# Patient Record
(demographics unavailable — no encounter records)

---

## 2024-12-30 NOTE — DISEASE MANAGEMENT
[Pathological] : TNM Stage: p [TTNM] : is [NTNM] : x [MTNM] : x [0] : 0 [de-identified] : 6591 [de-identified] : 5356 [de-identified] : right partial breast completed 6/3/24

## 2024-12-30 NOTE — HISTORY OF PRESENT ILLNESS
[FreeTextEntry1] : Ms. Roldan is a 65-year-old woman with DCIS of the left breast, status-post breast-conserving surgery and adjuvant radiation, presenting today for f/u.  She completed RT 5 fractions to her left partial breast for a total of 3000 cGy on 6/3/24.  She had her follow-up with Dr. Perry on 7/26/24. Bilateral mammogram and ultrasound are due in January 2025.  Did she start Tamoxifen under the care of Dr Jeanna Hilton in July?

## 2025-01-07 NOTE — REVIEW OF SYSTEMS
[Negative] : Allergic/Immunologic [Edema Limbs: Grade 0] : Edema Limbs: Grade 0  [Fatigue: Grade 0] : Fatigue: Grade 0 [Breast Pain: Grade 0] : Breast Pain: Grade 0 [Pruritus: Grade 0] : Pruritus: Grade 0 [Skin Hyperpigmentation: Grade 0] : Skin Hyperpigmentation: Grade 0 [Dermatitis Radiation: Grade 0] : Dermatitis Radiation: Grade 0

## 2025-01-08 NOTE — PHYSICAL EXAM
[] : no respiratory distress [Respiration, Rhythm And Depth] : normal respiratory rhythm and effort [Exaggerated Use Of Accessory Muscles For Inspiration] : no accessory muscle use [Nondistended] : nondistended [Supraclavicular Lymph Nodes Enlarged Bilaterally] : supraclavicular [Axillary Lymph Nodes Enlarged Bilaterally] : axillary [Normal] : oriented to person, place and time, the affect was normal, the mood was normal and not anxious [de-identified] : symmetric in size and shape; no suspicious masses; no long-term sequelae of RT-Tx

## 2025-01-08 NOTE — DISEASE MANAGEMENT
[TTNM] : is [NTNM] : x [MTNM] : x [de-identified] : 9212 [de-identified] : 5235 [de-identified] : right partial breast completed 6/3/24

## 2025-01-08 NOTE — PHYSICAL EXAM
[] : no respiratory distress [Respiration, Rhythm And Depth] : normal respiratory rhythm and effort [Exaggerated Use Of Accessory Muscles For Inspiration] : no accessory muscle use [Nondistended] : nondistended [Supraclavicular Lymph Nodes Enlarged Bilaterally] : supraclavicular [Axillary Lymph Nodes Enlarged Bilaterally] : axillary [Normal] : oriented to person, place and time, the affect was normal, the mood was normal and not anxious [de-identified] : symmetric in size and shape; no suspicious masses; no long-term sequelae of RT-Tx

## 2025-01-08 NOTE — DISEASE MANAGEMENT
[TTNM] : is [NTNM] : x [MTNM] : x [de-identified] : 2986 [de-identified] : 6207 [de-identified] : right partial breast completed 6/3/24

## 2025-01-08 NOTE — HISTORY OF PRESENT ILLNESS
[FreeTextEntry1] : Ms. Roldan is a 65-year-old woman with DCIS of the left breast, status-post breast-conserving surgery and adjuvant radiation, presenting today for follow-up s/p RT 5 fractions to her left partial breast for a total of 3000 cGy on 6/3/24.  Bilateral mammogram and ultrasound are scheduled for January 26, 2025 and she will see Dr. Perry in February.  She started Tamoxifen under the care of Dr. Hilton whom she will see in March. She denies any side effects from the medication.  01/07/25 Ms. Roldan is doing well, denies any breast pain or ROM difficulty. She reports receiving 2 sessions of physical therapy for her left breast scar tissue in the summer.

## 2025-01-08 NOTE — PHYSICAL EXAM
[] : no respiratory distress [Respiration, Rhythm And Depth] : normal respiratory rhythm and effort [Exaggerated Use Of Accessory Muscles For Inspiration] : no accessory muscle use [Nondistended] : nondistended [Supraclavicular Lymph Nodes Enlarged Bilaterally] : supraclavicular [Axillary Lymph Nodes Enlarged Bilaterally] : axillary [Normal] : oriented to person, place and time, the affect was normal, the mood was normal and not anxious [de-identified] : symmetric in size and shape; no suspicious masses; no long-term sequelae of RT-Tx

## 2025-01-08 NOTE — DISEASE MANAGEMENT
[TTNM] : is [NTNM] : x [MTNM] : x [de-identified] : 2208 [de-identified] : 4277 [de-identified] : right partial breast completed 6/3/24

## 2025-01-10 NOTE — ASSESSMENT
[FreeTextEntry1] : The patient is a 66-year-old G4, P1 postmenopausal white female with Polish, Irish, and Slovenian descent.  She underwent menarche at age 12 and had her first child at age 39.  She underwent menopause at age 42 and did use progesterone suppositories for couple years after menopause.  She did undergo fertility treatment in the past for about 6 months.  She quit smoking in her 30s and only rarely smoked in the past.  She has no known drug allergies.  She has a past medical history just significant for some hypertension as well as hypercholesterolemia and hypothyroidism.  She underwent a routine screening mammography on January 24, 2024 here at [facility] which showed some architectural distortion in her left breast upper outer quadrant with some calcifications seen in the left breast 3 and 6:00 regions.  Diagnostic mammography and ultrasound showed the architectural distortion to dissipate however the calcifications were indeterminant and ultrasound just showed some benign cystic findings.  She underwent ultrasound-guided core biopsies of the left breast on March 6, 2024 at [facility] and the calcifications at the 3-4 o'clock region did show intermediate grade DCIS which was ER/SD strongly positive between 91 and 100%.  There was a "cork" clip placed at that biopsy site.  The left breast 5-6 o'clock biopsy result was benign with a "Top-Hat" clip placed at that biopsy site.  I did review her imaging studies from [facility] on the PACS system which indeed showed this very small early area of calcifications in the left breast 3 and 6:00 region with the 3:00 region did show this intermediate grade DCIS.  She underwent a bilateral breast MRI on March 19, 2024 at [facility] just showing the localized DCIS in the left breast 3:00 region with some small hematoma surrounding both biopsy sites but no evidence of multifocal or contralateral disease.  [facility] genetic panel testing performed in March 2024 was negative for the breast, ovarian, colon cancer gene panel.  She then underwent a left breast partial mastectomy with Magseed localization and tissue transfer closure but no rk evaluation performed on April 5, 2024.  The final pathology showed intermediate grade DCIS measuring 5 mm with free margins.  She was seen by Dr. Jeanna Hilton and endocrine therapy was discussed and she was placed on tamoxifen 10 mg every other day.  She was seen by Dr. Sultana and underwent the FAST 5-day fractionated radiation to the left breast which finished on Tammie 3, 2024.  She underwent her last bilateral mammography and ultrasound which was reviewed from ??????.  On exam today, she has healed well from her left breast partial mastectomy and has done well after radiation therapy and has no evidence of recurrence.  She was reassured and I would like to see her again in 6 months around July 2025.  She will be due for her routine bilateral mammography and ultrasound again in ?????? and she was given prescriptions.  She will continue to follow-up with Dr. Jeanna Hilton and will remain on tamoxifen 10 mg every other day.

## 2025-01-10 NOTE — ADDENDUM
[FreeTextEntry1] : I spent greater than 75% of consultation in face-to-face counseling and coordination of care in this patient with a history of a left breast partial mastectomy for DCIS who comes in now for routine breast cancer screening/surveillance.

## 2025-01-10 NOTE — HISTORY OF PRESENT ILLNESS
[FreeTextEntry1] : The patient is a 66-year-old G4, P1 postmenopausal white female with Polish, Estonian, and Danish descent.  She underwent menarche at age 12 and had her first child at age 39.  She underwent menopause at age 42 and did use progesterone suppositories for couple years after menopause.  She did undergo fertility treatment in the past for about 6 months.  She quit smoking in her 30s and only rarely smoked in the past.  She has no known drug allergies.  She has a past medical history just significant for some hypertension as well as hypercholesterolemia and hypothyroidism.  She underwent a routine screening mammography on January 24, 2024 here at [facility] which showed some architectural distortion in her left breast upper outer quadrant with some calcifications seen in the left breast 3 and 6:00 regions.  Diagnostic mammography and ultrasound showed the architectural distortion to dissipate however the calcifications were indeterminant and ultrasound just showed some benign cystic findings.  She underwent ultrasound-guided core biopsies of the left breast on March 6, 2024 at [facility] in the calcifications at the 3-4 o'clock region did show intermediate grade DCIS which was ER/RI strongly positive between 91 and 100%.  There was a "cork" clip placed at that biopsy site.  The left breast 5-6 o'clock biopsy result was benign with a "Top-Hat" clip placed at that biopsy site.  She underwent a bilateral breast MRI on March 19, 2024 at [facility] just showing the localized DCIS in the left breast 3:00 region with some small hematoma surrounding both biopsy sites but no evidence of multifocal or contralateral disease.  [facility] genetic panel testing performed in March 2024 was negative for the breast, ovarian, colon cancer gene panel.  She then underwent a left breast partial mastectomy with Magseed localization and tissue transfer closure but no rk evaluation performed on April 5, 2024.  The final pathology showed a 5 mm focus of intermediate grade DCIS with completely free margins.  This was a stage 0 breast cancer.  She was seen by Dr. Jeanna Hilton and placed on tamoxifen 10 mg every other day.  She was seen by Dr. Sultana and underwent the FAST 5-day fractionated radiation to the left breast which finished on Tammie 3, 2024.  She comes in now for routine follow-up.

## 2025-01-10 NOTE — REASON FOR VISIT
[Follow-Up: _____] : a [unfilled] follow-up visit [FreeTextEntry1] : The patient is a postmenopausal white female with Polish, Georgian, and [place] descent.  She has a family history of breast cancer with a paternal third cousin with breast cancer in her 20s and her paternal great aunt with breast cancer in her 40s.  She was found to have some architectural distortion in her left breast upper outer quadrant as well as some new calcifications in the left breast 3 and 6:00 region on screening mammography in January 2024.  Diagnostic imaging showed the architectural distortion to resolve but she did require stereotactic core biopsies in her left breast 3 and 6:00 region which were performed in March 2024 and that 3-4 o'clock biopsy did show intermediate grade DCIS which was ER/FL strongly positive.  She underwent a bilateral breast MRI in March 2024 just showing the localized left breast cancer with some postbiopsy hematoma.  Genetic panel testing was negative.  She underwent a left breast partial mastectomy with Magseed localization with no rk evaluation on April 5, 2024.  The pathology just showed a 5 mm focus of intermediate grade DCIS with free margins making this a stage 0 breast cancer.  She was seen by Dr. Hilton and placed on tamoxifen 10 mg every other day and she underwent the FAST 5-day radiation to the left breast which finished on Tammie 3, 2024.  She comes in now for routine follow-up. [FreeTextEntry2] : April 5, 2024

## 2025-01-10 NOTE — PHYSICAL EXAM
[Normocephalic] : normocephalic [Atraumatic] : atraumatic [Supple] : supple [EOMI] : extra ocular movement intact [No Supraclavicular Adenopathy] : no supraclavicular adenopathy [No Cervical Adenopathy] : no cervical adenopathy [Examined in the supine and seated position] : examined in the supine and seated position [No dominant masses] : no dominant masses in right breast  [No dominant masses] : no dominant masses left breast [No Nipple Retraction] : no left nipple retraction [No Nipple Discharge] : no left nipple discharge [Breast Mass Right Breast ___cm] : no masses [Breast Mass Left Breast ___cm] : no masses [Breast Nipple Inversion] : nipples not inverted [Breast Nipple Retraction] : nipples not retracted [Breast Nipple Fissures] : nipples not fissured [Breast Nipple Flattening] : nipples not flattened [Breast Abnormal Lactation (Galactorrhea)] : no galactorrhea [Breast Abnormal Secretion Bloody Fluid] : no bloody discharge [Breast Abnormal Secretion Serous Fluid] : no serous discharge [Breast Abnormal Secretion Opalescent Fluid] : no milky discharge [No Axillary Lymphadenopathy] : no left axillary lymphadenopathy [No Edema] : no edema [No Rashes] : no rashes [No Ulceration] : no ulceration [de-identified] : On exam, the patient has healed well from her left breast partial mastectomy and she tolerated the FAST 5-day hypofractionated radiation treatment well and has no evidence of recurrence in the left breast.  She has no axillary, supraclavicular, or cervical adenopathy. [de-identified] : Status post partial mastectomy and radiation therapy with no evidence of recurrence.

## 2025-02-04 NOTE — PHYSICAL EXAM
[Normocephalic] : normocephalic [Atraumatic] : atraumatic [EOMI] : extra ocular movement intact [Supple] : supple [No Supraclavicular Adenopathy] : no supraclavicular adenopathy [No Cervical Adenopathy] : no cervical adenopathy [Examined in the supine and seated position] : examined in the supine and seated position [No dominant masses] : no dominant masses in right breast  [No dominant masses] : no dominant masses left breast [No Nipple Retraction] : no left nipple retraction [No Nipple Discharge] : no left nipple discharge [Breast Mass Right Breast ___cm] : no masses [Breast Mass Left Breast ___cm] : no masses [No Axillary Lymphadenopathy] : no left axillary lymphadenopathy [No Edema] : no edema [No Rashes] : no rashes [No Ulceration] : no ulceration [Breast Nipple Inversion] : nipples not inverted [Breast Nipple Retraction] : nipples not retracted [Breast Nipple Flattening] : nipples not flattened [Breast Nipple Fissures] : nipples not fissured [Breast Abnormal Lactation (Galactorrhea)] : no galactorrhea [Breast Abnormal Secretion Bloody Fluid] : no bloody discharge [Breast Abnormal Secretion Serous Fluid] : no serous discharge [Breast Abnormal Secretion Opalescent Fluid] : no milky discharge [de-identified] : On exam, the patient has healed well from her left breast partial mastectomy and she tolerated the FAST 5-day hypofractionated radiation treatment well and has no evidence of recurrence in the left breast.  She has no axillary, supraclavicular, or cervical adenopathy. [de-identified] : Status post partial mastectomy and radiation therapy with no evidence of recurrence.

## 2025-02-04 NOTE — HISTORY OF PRESENT ILLNESS
[FreeTextEntry1] : The patient is a 66-year-old G4, P1 postmenopausal white female with Polish, Telugu, and Uz[place] descent.  She underwent menarche at age 12 and had her first child at age 39.  She underwent menopause at age 42 and did use progesterone suppositories for couple years after menopause.  She did undergo fertility treatment in the past for about 6 months.  She quit smoking in her 30s and only rarely smoked in the past.  She has no known drug allergies.  She has a past medical history just significant for some hypertension as well as hypercholesterolemia and hypothyroidism.  She underwent a routine screening mammography on January 24, 2024 here at [facility] which showed some architectural distortion in her left breast upper outer quadrant with some calcifications seen in the left breast 3 and 6:00 regions.  Diagnostic mammography and ultrasound showed the architectural distortion to dissipate however the calcifications were indeterminant and ultrasound just showed some benign cystic findings.  She underwent ultrasound-guided core biopsies of the left breast on March 6, 2024 at [facility] in the calcifications at the 3-4 o'clock region did show intermediate grade DCIS which was ER/OK strongly positive between 91 and 100%.  There was a "cork" clip placed at that biopsy site.  The left breast 5-6 o'clock biopsy result was benign with a "Top-Hat" clip placed at that biopsy site.  She underwent a bilateral breast MRI on March 19, 2024 at [facility] just showing the localized DCIS in the left breast 3:00 region with some small hematoma surrounding both biopsy sites but no evidence of multifocal or contralateral disease.  [facility] genetic panel testing performed in March 2024 was negative for the breast, ovarian, colon cancer gene panel.  She then underwent a left breast partial mastectomy with Magseed localization and tissue transfer closure but no rk evaluation performed on April 5, 2024.  The final pathology showed a 5 mm focus of intermediate grade DCIS with completely free margins.  This was a stage 0 breast cancer.  She was seen by Dr. Jeanna Hilton and placed on tamoxifen 10 mg every other day.  She was seen by Dr. Sultana and underwent the FAST 5-day fractionated radiation to the left breast which finished on Tammie 3, 2024.  She did undergo a left breast 5:00 retroareolar ultrasound core biopsy on January 31, 2025 and the pathology is pending.  She comes in now for routine follow-up.

## 2025-02-04 NOTE — ASSESSMENT
[FreeTextEntry1] : The patient is a 66-year-old G4, P1 postmenopausal white female with Polish, Slovenian, and Yakut descent.  She underwent menarche at age 12 and had her first child at age 39.  She underwent menopause at age 42 and did use progesterone suppositories for couple years after menopause.  She did undergo fertility treatment in the past for about 6 months.  She quit smoking in her 30s and only rarely smoked in the past.  She has no known drug allergies.  She has a past medical history just significant for some hypertension as well as hypercholesterolemia and hypothyroidism.  She underwent a routine screening mammography on January 24, 2024 here at [facility] which showed some architectural distortion in her left breast upper outer quadrant with some calcifications seen in the left breast 3 and 6:00 regions.  Diagnostic mammography and ultrasound showed the architectural distortion to dissipate however the calcifications were indeterminant and ultrasound just showed some benign cystic findings.  She underwent ultrasound-guided core biopsies of the left breast on March 6, 2024 at [facility] and the calcifications at the 3-4 o'clock region did show intermediate grade DCIS which was ER/WV strongly positive between 91 and 100%.  There was a "cork" clip placed at that biopsy site.  The left breast 5-6 o'clock biopsy result was benign with a "Top-Hat" clip placed at that biopsy site.  I did review her imaging studies from [facility] on the PACS system which indeed showed this very small early area of calcifications in the left breast 3 and 6:00 region with the 3:00 region did show this intermediate grade DCIS.  She underwent a bilateral breast MRI on March 19, 2024 at [facility] just showing the localized DCIS in the left breast 3:00 region with some small hematoma surrounding both biopsy sites but no evidence of multifocal or contralateral disease.  [facility] genetic panel testing performed in March 2024 was negative for the breast, ovarian, colon cancer gene panel.  She then underwent a left breast partial mastectomy with Magseed localization and tissue transfer closure but no rk evaluation performed on April 5, 2024.  The final pathology showed intermediate grade DCIS measuring 5 mm with free margins.  She was seen by Dr. Jeanna Hilton and endocrine therapy was discussed and she was placed on tamoxifen 10 mg every other day.  She was seen by Dr. Sultana and underwent the FAST 5-day fractionated radiation to the left breast which finished on Tammie 3, 2024.  She underwent her last bilateral mammography and ultrasound which was reviewed from January 28, 2025 performed at [facility] which did show a new left breast 5:00 retroareolar lobulated density for which she underwent an ultrasound-guided core biopsy performed on January 31, 2025 and the pathology is not back yet.  On exam today, she has healed well from her left breast partial mastectomy and has done well after radiation therapy and has no evidence of recurrence.  She has a Steri-Strip and some bruising from her recent left breast ultrasound core biopsy.  I will follow-up on the pathology and give her a call with the results as soon as they are available.  If the biopsy is benign, she should follow-up again in 6 months around August 2025.  She would then be due for her routine bilateral mammography and ultrasound again in January 2026 and she was given prescriptions.  She will continue to follow-up with Dr. Jeanna Hilton and will remain on tamoxifen 10 mg every other day.

## 2025-02-04 NOTE — REASON FOR VISIT
[Follow-Up: _____] : a [unfilled] follow-up visit [FreeTextEntry1] : The patient is a postmenopausal white female with Polish, Jamaican, and [place] descent.  She has a family history of breast cancer with a paternal third cousin with breast cancer in her 20s and her paternal great aunt with breast cancer in her 40s.  She was found to have some architectural distortion in her left breast upper outer quadrant as well as some new calcifications in the left breast 3 and 6:00 region on screening mammography in January 2024.  Diagnostic imaging showed the architectural distortion to resolve but she did require stereotactic core biopsies in her left breast 3 and 6:00 region which were performed in March 2024 and that 3-4 o'clock biopsy did show intermediate grade DCIS which was ER/PA strongly positive.  She underwent a bilateral breast MRI in March 2024 just showing the localized left breast cancer with some postbiopsy hematoma.  Genetic panel testing was negative.  She underwent a left breast partial mastectomy with Magseed localization with no rk evaluation on April 5, 2024.  The pathology just showed a 5 mm focus of intermediate grade DCIS with free margins making this a stage 0 breast cancer.  She was seen by Dr. Hilton and placed on tamoxifen 10 mg every other day and she underwent the FAST 5-day radiation to the left breast which finished on Tammie 3, 2024.  She comes in now for routine follow-up. [FreeTextEntry2] : April 5, 2024

## 2025-02-04 NOTE — PHYSICAL EXAM
[Normocephalic] : normocephalic [Atraumatic] : atraumatic [EOMI] : extra ocular movement intact [Supple] : supple [No Supraclavicular Adenopathy] : no supraclavicular adenopathy [No Cervical Adenopathy] : no cervical adenopathy [Examined in the supine and seated position] : examined in the supine and seated position [No dominant masses] : no dominant masses in right breast  [No dominant masses] : no dominant masses left breast [No Nipple Retraction] : no left nipple retraction [No Nipple Discharge] : no left nipple discharge [Breast Mass Right Breast ___cm] : no masses [Breast Mass Left Breast ___cm] : no masses [No Axillary Lymphadenopathy] : no left axillary lymphadenopathy [No Edema] : no edema [No Rashes] : no rashes [No Ulceration] : no ulceration [Breast Nipple Inversion] : nipples not inverted [Breast Nipple Retraction] : nipples not retracted [Breast Nipple Flattening] : nipples not flattened [Breast Nipple Fissures] : nipples not fissured [Breast Abnormal Lactation (Galactorrhea)] : no galactorrhea [Breast Abnormal Secretion Bloody Fluid] : no bloody discharge [Breast Abnormal Secretion Serous Fluid] : no serous discharge [Breast Abnormal Secretion Opalescent Fluid] : no milky discharge [de-identified] : On exam, the patient has healed well from her left breast partial mastectomy and she tolerated the FAST 5-day hypofractionated radiation treatment well and has no evidence of recurrence in the left breast.  She has no axillary, supraclavicular, or cervical adenopathy. [de-identified] : Status post partial mastectomy and radiation therapy with no evidence of recurrence.

## 2025-02-04 NOTE — HISTORY OF PRESENT ILLNESS
[FreeTextEntry1] : The patient is a 66-year-old G4, P1 postmenopausal white female with Polish, Romanian, and [place] descent.  She underwent menarche at age 12 and had her first child at age 39.  She underwent menopause at age 42 and did use progesterone suppositories for couple years after menopause.  She did undergo fertility treatment in the past for about 6 months.  She quit smoking in her 30s and only rarely smoked in the past.  She has no known drug allergies.  She has a past medical history just significant for some hypertension as well as hypercholesterolemia and hypothyroidism.  She underwent a routine screening mammography on January 24, 2024 here at [facility] which showed some architectural distortion in her left breast upper outer quadrant with some calcifications seen in the left breast 3 and 6:00 regions.  Diagnostic mammography and ultrasound showed the architectural distortion to dissipate however the calcifications were indeterminant and ultrasound just showed some benign cystic findings.  She underwent ultrasound-guided core biopsies of the left breast on March 6, 2024 at [facility] in the calcifications at the 3-4 o'clock region did show intermediate grade DCIS which was ER/FL strongly positive between 91 and 100%.  There was a "cork" clip placed at that biopsy site.  The left breast 5-6 o'clock biopsy result was benign with a "Top-Hat" clip placed at that biopsy site.  She underwent a bilateral breast MRI on March 19, 2024 at [facility] just showing the localized DCIS in the left breast 3:00 region with some small hematoma surrounding both biopsy sites but no evidence of multifocal or contralateral disease.  [facility] genetic panel testing performed in March 2024 was negative for the breast, ovarian, colon cancer gene panel.  She then underwent a left breast partial mastectomy with Magseed localization and tissue transfer closure but no rk evaluation performed on April 5, 2024.  The final pathology showed a 5 mm focus of intermediate grade DCIS with completely free margins.  This was a stage 0 breast cancer.  She was seen by Dr. Jeanna Hilton and placed on tamoxifen 10 mg every other day.  She was seen by Dr. Sultana and underwent the FAST 5-day fractionated radiation to the left breast which finished on Tammie 3, 2024.  She did undergo a left breast 5:00 retroareolar ultrasound core biopsy on January 31, 2025 and the pathology is pending.  She comes in now for routine follow-up.

## 2025-02-04 NOTE — REASON FOR VISIT
[Follow-Up: _____] : a [unfilled] follow-up visit [FreeTextEntry1] : The patient is a postmenopausal white female with Polish, [place]e, and [place] descent.  She has a family history of breast cancer with a paternal third cousin with breast cancer in her 20s and her paternal great aunt with breast cancer in her 40s.  She was found to have some architectural distortion in her left breast upper outer quadrant as well as some new calcifications in the left breast 3 and 6:00 region on screening mammography in January 2024.  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  She underwent a bilateral breast MRI in March 2024 just showing the localized left breast cancer with some postbiopsy hematoma.  Genetic panel testing was negative.  She underwent a left breast partial mastectomy with Magseed localization with no rk evaluation on April 5, 2024.  The pathology just showed a 5 mm focus of intermediate grade DCIS with free margins making this a stage 0 breast cancer.  She was seen by Dr. Hilton and placed on tamoxifen 10 mg every other day and she underwent the FAST 5-day radiation to the left breast which finished on Tammie 3, 2024.  She comes in now for routine follow-up. [FreeTextEntry2] : April 5, 2024

## 2025-02-04 NOTE — ASSESSMENT
[FreeTextEntry1] : The patient is a 66-year-old G4, P1 postmenopausal white female with Polish, Swedish, and Turkish descent.  She underwent menarche at age 12 and had her first child at age 39.  She underwent menopause at age 42 and did use progesterone suppositories for couple years after menopause.  She did undergo fertility treatment in the past for about 6 months.  She quit smoking in her 30s and only rarely smoked in the past.  She has no known drug allergies.  She has a past medical history just significant for some hypertension as well as hypercholesterolemia and hypothyroidism.  She underwent a routine screening mammography on January 24, 2024 here at [facility] which showed some architectural distortion in her left breast upper outer quadrant with some calcifications seen in the left breast 3 and 6:00 regions.  Diagnostic mammography and ultrasound showed the architectural distortion to dissipate however the calcifications were indeterminant and ultrasound just showed some benign cystic findings.  She underwent ultrasound-guided core biopsies of the left breast on March 6, 2024 at [facility] and the calcifications at the 3-4 o'clock region did show intermediate grade DCIS which was ER/KS strongly positive between 91 and 100%.  There was a "cork" clip placed at that biopsy site.  The left breast 5-6 o'clock biopsy result was benign with a "Top-Hat" clip placed at that biopsy site.  I did review her imaging studies from [facility] on the PACS system which indeed showed this very small early area of calcifications in the left breast 3 and 6:00 region with the 3:00 region did show this intermediate grade DCIS.  She underwent a bilateral breast MRI on March 19, 2024 at [facility] just showing the localized DCIS in the left breast 3:00 region with some small hematoma surrounding both biopsy sites but no evidence of multifocal or contralateral disease.  [facility] genetic panel testing performed in March 2024 was negative for the breast, ovarian, colon cancer gene panel.  She then underwent a left breast partial mastectomy with Magseed localization and tissue transfer closure but no rk evaluation performed on April 5, 2024.  The final pathology showed intermediate grade DCIS measuring 5 mm with free margins.  She was seen by Dr. Jeanna Hilton and endocrine therapy was discussed and she was placed on tamoxifen 10 mg every other day.  She was seen by Dr. Sultana and underwent the FAST 5-day fractionated radiation to the left breast which finished on Tammie 3, 2024.  She underwent her last bilateral mammography and ultrasound which was reviewed from January 28, 2025 performed at [facility] which did show a new left breast 5:00 retroareolar lobulated density for which she underwent an ultrasound-guided core biopsy performed on January 31, 2025 and the pathology is not back yet.  On exam today, she has healed well from her left breast partial mastectomy and has done well after radiation therapy and has no evidence of recurrence.  She has a Steri-Strip and some bruising from her recent left breast ultrasound core biopsy.  I will follow-up on the pathology and give her a call with the results as soon as they are available.  If the biopsy is benign, she should follow-up again in 6 months around August 2025.  She would then be due for her routine bilateral mammography and ultrasound again in January 2026 and she was given prescriptions.  She will continue to follow-up with Dr. Jeanna Hilton and will remain on tamoxifen 10 mg every other day.

## 2025-07-09 NOTE — HISTORY OF PRESENT ILLNESS
[FreeTextEntry1] : Ms. Roldan is a 66-year-old woman with DCIS of the left breast, status-post breast-conserving surgery and adjuvant radiation, presenting today for follow-up s/p RT 5 fractions to her left partial breast for a total of 3000 cGy on 6/3/24.  1/28/25 Mammo/US at [facility] IMPRESSION: At 5:00 in the left retroareolar region there is a new lobulated, hypoechoic, heterogeneous mass that could represent postbiopsy change at a site of prior stereotactic biopsy. However, ultrasound-guided biopsy is recommended at the current time to determine histology and exclude malignancy. Interval postoperative changes on the left. No mammographic evidence of malignancy. No sonographic evidence of malignancy in the remainder of the breasts bilaterally. RECOMMENDATION:  Ultrasound biopsy. BI-RADS 4A  -Suspicious Finding(s) -Low Suspicion for Malignancy  Mammo 1/31/25 IMPRESSION: Status post ultrasound-guided core needle biopsy of the heterogeneous mass at 5:00 in the left retroareolar region with good clip placement.  Pathology 1/31/25 Fat necrosis Negative for carcinoma  She followed up again with Dr Perry 6/16/25 and was doing well at that time.  She will have imaging again in Jan 2026 She is taking Tamoxifen under the care of Dr Hilton whom she will see again in September 2025 7/9/25 F/U.  She has been feeling well with no new issues.

## 2025-07-09 NOTE — DISEASE MANAGEMENT
[Pathological] : TNM Stage: p [0] : 0 [TTNM] : is [NTNM] : x [MTNM] : x [de-identified] : 0114 [de-identified] : 1322 [de-identified] : left partial breast completed 6/3/24

## 2025-07-09 NOTE — DISEASE MANAGEMENT
[Pathological] : TNM Stage: p [0] : 0 [TTNM] : is [NTNM] : x [MTNM] : x [de-identified] : 6640 [de-identified] : 4332 [de-identified] : left partial breast completed 6/3/24

## 2025-07-09 NOTE — PHYSICAL EXAM
[] : no respiratory distress [Respiration, Rhythm And Depth] : normal respiratory rhythm and effort [Exaggerated Use Of Accessory Muscles For Inspiration] : no accessory muscle use [Abdomen Soft] : soft [Nondistended] : nondistended [Abdomen Tenderness] : non-tender [Normal] : oriented to person, place and time, the affect was normal, the mood was normal and not anxious [de-identified] : breasts are symmetric in size and shape; both breasts are smooth and supple w/ minimal e/o post RT fibrosis at site of the tumor bed left breast

## 2025-07-09 NOTE — PHYSICAL EXAM
[] : no respiratory distress [Respiration, Rhythm And Depth] : normal respiratory rhythm and effort [Exaggerated Use Of Accessory Muscles For Inspiration] : no accessory muscle use [Abdomen Soft] : soft [Nondistended] : nondistended [Abdomen Tenderness] : non-tender [Normal] : oriented to person, place and time, the affect was normal, the mood was normal and not anxious [de-identified] : breasts are symmetric in size and shape; both breasts are smooth and supple w/ minimal e/o post RT fibrosis at site of the tumor bed left breast

## 2025-07-09 NOTE — HISTORY OF PRESENT ILLNESS
[FreeTextEntry1] : Ms. Roldan is a 66-year-old woman with DCIS of the left breast, status-post breast-conserving surgery and adjuvant radiation, presenting today for follow-up s/p RT 5 fractions to her left partial breast for a total of 3000 cGy on 6/3/24.  1/28/25 Mammo/US at [facility] IMPRESSION: At 5:00 in the left retroareolar region there is a new lobulated, hypoechoic, heterogeneous mass that could represent postbiopsy change at a site of prior stereotactic biopsy. However, ultrasound-guided biopsy is recommended at the current time to determine histology and exclude malignancy. Interval postoperative changes on the left. No mammographic evidence of malignancy. No sonographic evidence of malignancy in the remainder of the breasts bilaterally. RECOMMENDATION:  Ultrasound biopsy. BI-RADS 4A  -Suspicious Finding(s) -Low Suspicion for Malignancy  Mammo 1/31/25 IMPRESSION: Status post ultrasound-guided core needle biopsy of the heterogeneous mass at 5:00 in the left retroareolar region with good clip placement.  Pathology 1/31/25 Fat necrosis Negative for carcinoma  She followed up again with Dr Perry 6/16/25 and was doing well at that time.  She will have imaging again in Jan 2026 She is taking Tamoxifen under the care of Dr Hilton whom she will see again in September 2025 7/9/25 F/U.  She has been feeling well with no new issues.